# Patient Record
Sex: FEMALE | Race: WHITE | Employment: UNEMPLOYED | URBAN - METROPOLITAN AREA
[De-identification: names, ages, dates, MRNs, and addresses within clinical notes are randomized per-mention and may not be internally consistent; named-entity substitution may affect disease eponyms.]

---
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
## 2022-01-24 ENCOUNTER — CLINICAL SUPPORT (OUTPATIENT)
Dept: FAMILY MEDICINE CLINIC | Age: 22
End: 2022-01-24
Payer: COMMERCIAL

## 2022-01-24 VITALS — TEMPERATURE: 98.1 F

## 2022-01-24 DIAGNOSIS — Z11.1 ENCOUNTER FOR PPD TEST: Primary | ICD-10-CM

## 2022-01-24 PROCEDURE — 86580 TB INTRADERMAL TEST: CPT | Performed by: FAMILY MEDICINE

## 2022-01-24 NOTE — PROGRESS NOTES
Chief Complaint   Patient presents with    PPD Reading     skin test       No Known Allergies     Visit Vitals  Temp 98.1 °F (36.7 °C) (Temporal)        Test done in right forearm  0.1ml   ndc [id number]  [id number]  EXP 6/2/23

## 2022-01-26 ENCOUNTER — CLINICAL SUPPORT (OUTPATIENT)
Dept: FAMILY MEDICINE CLINIC | Age: 22
End: 2022-01-26

## 2022-01-26 DIAGNOSIS — Z11.1 ENCOUNTER FOR PPD TEST: Primary | ICD-10-CM

## 2022-01-26 LAB
MM INDURATION POC: 0 MM (ref 0–5)
PPD POC: NEGATIVE NEGATIVE

## 2022-01-28 LAB — SARS-COV-2, NAA: DETECTED

## 2022-03-18 PROBLEM — N91.4 SECONDARY OLIGOMENORRHEA: Status: ACTIVE | Noted: 2017-12-05

## 2022-03-18 PROBLEM — N91.1 SECONDARY AMENORRHEA: Status: ACTIVE | Noted: 2017-04-19

## 2022-03-18 PROBLEM — Z01.01 FAILED VISION SCREEN: Status: ACTIVE | Noted: 2017-04-19

## 2022-03-19 PROBLEM — K21.9 GASTRIC REFLUX: Status: ACTIVE | Noted: 2019-07-19

## 2022-03-20 PROBLEM — B80 PINWORM DISEASE: Status: ACTIVE | Noted: 2019-07-19

## 2022-08-29 ENCOUNTER — OFFICE VISIT (OUTPATIENT)
Dept: ORTHOPEDIC SURGERY | Age: 22
End: 2022-08-29
Payer: COMMERCIAL

## 2022-08-29 VITALS — WEIGHT: 135 LBS | HEIGHT: 65 IN | BODY MASS INDEX: 22.49 KG/M2

## 2022-08-29 DIAGNOSIS — M25.561 RIGHT KNEE PAIN, UNSPECIFIED CHRONICITY: Primary | ICD-10-CM

## 2022-08-29 PROCEDURE — 99214 OFFICE O/P EST MOD 30 MIN: CPT | Performed by: ORTHOPAEDIC SURGERY

## 2022-08-29 NOTE — PROGRESS NOTES
Marylou Rivera (: 2000) is a 23 y.o. female patient, here for evaluation of the following chief complaint(s):  Knee Pain (Follow up on Right knee pain  for 1 year. Prior MRI done. TX exercises)       ASSESSMENT/PLAN:  Below is the assessment and plan developed based on review of pertinent history, physical exam, labs, studies, and medications. Anterior knee pain did not respond to physical therapy I do not know how hard she tried she had an MRI back in 2021 it was remarkable for some degenerative changes of the posterior horn of the medial meniscus and an elevated TT-TG distance we had a lengthy discussion about a Lisa osteotomy and a knee scope I like her to try physical therapy again I do not think she gave her best she is college student at [employer] working to set her up with PT and regroup in February or March if she continues to be miserable then I think a scope lateral release Lisa are in order we could do this in May give her the summer to recover 40+ minutes face-to-face time      1. Right knee pain, unspecified chronicity  -     XR KNEE RT MIN 4 V; Future      No follow-ups on file. SUBJECTIVE/OBJECTIVE:  Marylou Rivera (: 2000) is a 23 y.o. female who presents today for the following:  Chief Complaint   Patient presents with    Knee Pain     Follow up on Right knee pain  for 1 year. Prior MRI done.  TX exercises       Persistent right knee pain we have tried physical therapy home exercise program points the patella on the undersurface of the knee as a site of discomfort has not responded to conservative measures    IMAGING:  Reviewed her MRI AP lateral sunrise tunnel view of the right knee no fracture no loose body no OCD lesion growth plates are closed she is skeletally mature    No Known Allergies    Current Outpatient Medications   Medication Sig    ibuprofen (MOTRIN) 600 mg tablet Take 1 Tablet by mouth every six (6) hours as needed for Pain.    metFORMIN ER (GLUCOPHAGE XR) 750 mg tablet 2 tabs at dinner  Indications: polycystic ovarian syndrome, a disease with cysts in the ovaries    tretinoin (RETIN-A) 0.025 % topical cream     clindamycin-benzoyl-emol cmb94 6.8-1 % cpcg 1 Applicator by Apply Externally route daily. ferrous sulfate 325 mg (65 mg iron) tablet Take 1 Tab by mouth Daily (before breakfast). (Patient not taking: Reported on 8/29/2022)    ergocalciferol (ERGOCALCIFEROL) 1,250 mcg (50,000 unit) capsule Take 1 Capsule by mouth every seven (7) days. (Patient not taking: Reported on 8/29/2022)    cholecalciferol (VITAMIN D3) (2,000 UNITS /50 MCG) cap capsule Take 2,000 Units by mouth daily. (Patient not taking: No sig reported)    fluticasone propionate (FLONASE) 50 mcg/actuation nasal spray USE ONE SPRAY IN EACH NOSTRIL EVERY DAY (Patient not taking: No sig reported)     No current facility-administered medications for this visit. Past Medical History:   Diagnosis Date    Allergic rhinitis 12/21/2011    Freckled skin 6/24/2014    PCOS (polycystic ovarian syndrome)         History reviewed. No pertinent surgical history. Family History   Problem Relation Age of Onset    Cancer Maternal Grandmother         Social History     Tobacco Use    Smoking status: Never    Smokeless tobacco: Never   Substance Use Topics    Alcohol use: No        Review of Systems     No flowsheet data found. Vitals:  Ht 5' 5\" (1.651 m)   Wt 135 lb (61.2 kg)   BMI 22.47 kg/m²    Body mass index is 22.47 kg/m². Physical Exam    Pleasant young lady well-groomed anterior knee pain with a J sign tight lateral retinaculum positive grind positive apprehension right hip is full painless range of motion the patient ambulates with a nonantalgic gait. There is negative Trendelenburg gait. There is no tenderness to palpation in the groin, greater trochanter, sciatic notch or sacroiliac joints. There are no masses, redness or ecchymoses.   There is no crepitus to range of motion. No pain with flexion and internal rotation. There is no instability present in the hip. There is no snapping noted. There is grade 5/5 muscle strength. Light touch is intact. Deep tendon reflexes are +2.  +2 pulses at the posterior tib. dorsal pedis. There are no café au lait spots or fibromyalgia. No lymphadenopathy present. No limb length discrepancy. Right knee has full extension stable to varus and valgus stress negative Lachman negative drawer no obvious effusion skin looks good      An electronic signature was used to authenticate this note.   -- Carlos Branch MD

## 2024-12-18 ENCOUNTER — OFFICE VISIT (OUTPATIENT)
Age: 24
End: 2024-12-18
Payer: COMMERCIAL

## 2024-12-18 VITALS
HEART RATE: 70 BPM | TEMPERATURE: 97.9 F | RESPIRATION RATE: 16 BRPM | OXYGEN SATURATION: 100 % | HEIGHT: 65 IN | WEIGHT: 143 LBS | BODY MASS INDEX: 23.82 KG/M2 | SYSTOLIC BLOOD PRESSURE: 113 MMHG | DIASTOLIC BLOOD PRESSURE: 74 MMHG

## 2024-12-18 DIAGNOSIS — N91.4 SECONDARY OLIGOMENORRHEA: ICD-10-CM

## 2024-12-18 DIAGNOSIS — L65.9 NONSCARRING HAIR LOSS: ICD-10-CM

## 2024-12-18 DIAGNOSIS — E28.2 PCOS (POLYCYSTIC OVARIAN SYNDROME): ICD-10-CM

## 2024-12-18 DIAGNOSIS — L98.9 SKIN LESION: ICD-10-CM

## 2024-12-18 DIAGNOSIS — Z00.00 WELL ADULT EXAM: Primary | ICD-10-CM

## 2024-12-18 DIAGNOSIS — Z23 ENCOUNTER FOR IMMUNIZATION: ICD-10-CM

## 2024-12-18 DIAGNOSIS — I95.1 ORTHOSTATIC HYPOTENSION: ICD-10-CM

## 2024-12-18 DIAGNOSIS — Z11.1 SCREENING FOR TUBERCULOSIS: ICD-10-CM

## 2024-12-18 DIAGNOSIS — Z76.89 ENCOUNTER TO ESTABLISH CARE: ICD-10-CM

## 2024-12-18 PROBLEM — Z01.01 FAILED VISION SCREEN: Status: RESOLVED | Noted: 2017-04-19 | Resolved: 2024-12-18

## 2024-12-18 PROBLEM — B80 PINWORM DISEASE: Status: RESOLVED | Noted: 2019-07-19 | Resolved: 2024-12-18

## 2024-12-18 LAB
ANION GAP SERPL CALC-SCNC: 3 MMOL/L (ref 2–12)
BASOPHILS # BLD: 0.1 K/UL (ref 0–0.1)
BASOPHILS NFR BLD: 1 % (ref 0–1)
BUN SERPL-MCNC: 14 MG/DL (ref 6–20)
BUN/CREAT SERPL: 22 (ref 12–20)
CALCIUM SERPL-MCNC: 10.3 MG/DL (ref 8.5–10.1)
CHLORIDE SERPL-SCNC: 105 MMOL/L (ref 97–108)
CO2 SERPL-SCNC: 27 MMOL/L (ref 21–32)
CREAT SERPL-MCNC: 0.63 MG/DL (ref 0.55–1.02)
DIFFERENTIAL METHOD BLD: ABNORMAL
EOSINOPHIL # BLD: 0.2 K/UL (ref 0–0.4)
EOSINOPHIL NFR BLD: 3 % (ref 0–7)
ERYTHROCYTE [DISTWIDTH] IN BLOOD BY AUTOMATED COUNT: 14.4 % (ref 11.5–14.5)
GLUCOSE SERPL-MCNC: 80 MG/DL (ref 65–100)
HCT VFR BLD AUTO: 37.7 % (ref 35–47)
HGB BLD-MCNC: 11.8 G/DL (ref 11.5–16)
IMM GRANULOCYTES # BLD AUTO: 0 K/UL (ref 0–0.04)
IMM GRANULOCYTES NFR BLD AUTO: 0 % (ref 0–0.5)
IRON SATN MFR SERPL: 19 % (ref 20–50)
IRON SERPL-MCNC: 78 UG/DL (ref 35–150)
LYMPHOCYTES # BLD: 2.9 K/UL (ref 0.8–3.5)
LYMPHOCYTES NFR BLD: 55 % (ref 12–49)
MCH RBC QN AUTO: 23.8 PG (ref 26–34)
MCHC RBC AUTO-ENTMCNC: 31.3 G/DL (ref 30–36.5)
MCV RBC AUTO: 76 FL (ref 80–99)
MONOCYTES # BLD: 0.5 K/UL (ref 0–1)
MONOCYTES NFR BLD: 9 % (ref 5–13)
NEUTS SEG # BLD: 1.7 K/UL (ref 1.8–8)
NEUTS SEG NFR BLD: 32 % (ref 32–75)
NRBC # BLD: 0 K/UL (ref 0–0.01)
NRBC BLD-RTO: 0 PER 100 WBC
PLATELET # BLD AUTO: 364 K/UL (ref 150–400)
PMV BLD AUTO: 10.9 FL (ref 8.9–12.9)
POTASSIUM SERPL-SCNC: 4.3 MMOL/L (ref 3.5–5.1)
RBC # BLD AUTO: 4.96 M/UL (ref 3.8–5.2)
SODIUM SERPL-SCNC: 135 MMOL/L (ref 136–145)
TIBC SERPL-MCNC: 403 UG/DL (ref 250–450)
WBC # BLD AUTO: 5.3 K/UL (ref 3.6–11)

## 2024-12-18 PROCEDURE — 99204 OFFICE O/P NEW MOD 45 MIN: CPT | Performed by: STUDENT IN AN ORGANIZED HEALTH CARE EDUCATION/TRAINING PROGRAM

## 2024-12-18 PROCEDURE — 99385 PREV VISIT NEW AGE 18-39: CPT | Performed by: STUDENT IN AN ORGANIZED HEALTH CARE EDUCATION/TRAINING PROGRAM

## 2024-12-18 PROCEDURE — 90715 TDAP VACCINE 7 YRS/> IM: CPT | Performed by: STUDENT IN AN ORGANIZED HEALTH CARE EDUCATION/TRAINING PROGRAM

## 2024-12-18 RX ORDER — METFORMIN HYDROCHLORIDE 750 MG/1
750 TABLET, EXTENDED RELEASE ORAL
Qty: 90 TABLET | Refills: 0 | Status: SHIPPED | OUTPATIENT
Start: 2024-12-18

## 2024-12-18 SDOH — ECONOMIC STABILITY: FOOD INSECURITY: WITHIN THE PAST 12 MONTHS, YOU WORRIED THAT YOUR FOOD WOULD RUN OUT BEFORE YOU GOT MONEY TO BUY MORE.: NEVER TRUE

## 2024-12-18 SDOH — ECONOMIC STABILITY: FOOD INSECURITY: WITHIN THE PAST 12 MONTHS, THE FOOD YOU BOUGHT JUST DIDN'T LAST AND YOU DIDN'T HAVE MONEY TO GET MORE.: NEVER TRUE

## 2024-12-18 SDOH — ECONOMIC STABILITY: INCOME INSECURITY: HOW HARD IS IT FOR YOU TO PAY FOR THE VERY BASICS LIKE FOOD, HOUSING, MEDICAL CARE, AND HEATING?: NOT HARD AT ALL

## 2024-12-18 ASSESSMENT — ENCOUNTER SYMPTOMS
SHORTNESS OF BREATH: 0
COUGH: 0

## 2024-12-18 ASSESSMENT — PATIENT HEALTH QUESTIONNAIRE - PHQ9
SUM OF ALL RESPONSES TO PHQ QUESTIONS 1-9: 0
2. FEELING DOWN, DEPRESSED OR HOPELESS: NOT AT ALL
SUM OF ALL RESPONSES TO PHQ QUESTIONS 1-9: 0
SUM OF ALL RESPONSES TO PHQ9 QUESTIONS 1 & 2: 0
1. LITTLE INTEREST OR PLEASURE IN DOING THINGS: NOT AT ALL

## 2024-12-18 NOTE — PROGRESS NOTES
After obtaining consent, and per orders of Dr. Valentin, injection of TDAP given in Left deltoid by Olivia Crawford LPN. Patient instructed to remain in clinic for 20 minutes afterwards, and to report any adverse reaction to me immediately.RM:    Chief Complaint   Patient presents with    Establish Care     Pt would like to establish care       Fasting No    Vitals:    12/18/24 1058   BP: 113/74   Site: Left Upper Arm   Position: Sitting   Cuff Size: Large Adult   Pulse: 70   Resp: 16   Temp: 97.9 °F (36.6 °C)   TempSrc: Oral   SpO2: 100%   Weight: 64.9 kg (143 lb)   Height: 1.651 m (5' 5\")             No data to display                \"Have you been to the ER, urgent care clinic since your last visit?  Hospitalized since your last visit?\"    NO    “Have you seen or consulted any other health care providers outside of [facility] since your last visit?”    NO     “Have you had a pap smear?”    NO    No cervical cancer screening on file             Click Here for Release of Records Request   AVS  education, follow up, and recommendations provided and addressed with patient.  services used to advise patient no  
history and medications.  I have reviewed pertinent labs results and other data. I have discussed the diagnosis with the patient and the intended plan as seen in the above orders. The patient has received an after-visit summary and questions were answered concerning future plans. I have discussed medication side effects and warnings with the patient as well.    Oscar Valentin MD  12/18/24

## 2024-12-20 LAB — TSH SERPL DL<=0.05 MIU/L-ACNC: 1.8 UIU/ML (ref 0.45–4.5)

## 2024-12-26 LAB
M TB IFN-G BLD-IMP: NEGATIVE
M TB IFN-G CD4+ T-CELLS BLD-ACNC: 0.3 IU/ML
M TBIFN-G CD4+ CD8+T-CELLS BLD-ACNC: 0.28 IU/ML
QUANTIFERON CRITERIA: NORMAL
QUANTIFERON MITOGEN VALUE: >10 IU/ML
QUANTIFERON NIL VALUE: 0.58 IU/ML

## 2025-06-19 ENCOUNTER — OFFICE VISIT (OUTPATIENT)
Age: 25
End: 2025-06-19
Payer: COMMERCIAL

## 2025-06-19 VITALS
BODY MASS INDEX: 23.79 KG/M2 | TEMPERATURE: 97.9 F | SYSTOLIC BLOOD PRESSURE: 107 MMHG | HEIGHT: 65 IN | DIASTOLIC BLOOD PRESSURE: 71 MMHG | WEIGHT: 142.8 LBS | RESPIRATION RATE: 16 BRPM | OXYGEN SATURATION: 98 % | HEART RATE: 86 BPM

## 2025-06-19 DIAGNOSIS — B36.0 TINEA VERSICOLOR: Primary | ICD-10-CM

## 2025-06-19 DIAGNOSIS — Z11.1 SCREENING FOR TUBERCULOSIS: ICD-10-CM

## 2025-06-19 DIAGNOSIS — R53.83 FATIGUE, UNSPECIFIED TYPE: ICD-10-CM

## 2025-06-19 DIAGNOSIS — R21 RASH: ICD-10-CM

## 2025-06-19 PROCEDURE — 99214 OFFICE O/P EST MOD 30 MIN: CPT | Performed by: STUDENT IN AN ORGANIZED HEALTH CARE EDUCATION/TRAINING PROGRAM

## 2025-06-19 RX ORDER — KETOCONAZOLE 20 MG/ML
SHAMPOO, SUSPENSION TOPICAL
Qty: 120 ML | Refills: 2 | Status: SHIPPED | OUTPATIENT
Start: 2025-06-19

## 2025-06-19 SDOH — ECONOMIC STABILITY: FOOD INSECURITY: WITHIN THE PAST 12 MONTHS, YOU WORRIED THAT YOUR FOOD WOULD RUN OUT BEFORE YOU GOT MONEY TO BUY MORE.: NEVER TRUE

## 2025-06-19 SDOH — ECONOMIC STABILITY: FOOD INSECURITY: WITHIN THE PAST 12 MONTHS, THE FOOD YOU BOUGHT JUST DIDN'T LAST AND YOU DIDN'T HAVE MONEY TO GET MORE.: NEVER TRUE

## 2025-06-19 ASSESSMENT — ENCOUNTER SYMPTOMS
SHORTNESS OF BREATH: 0
COUGH: 0

## 2025-06-19 ASSESSMENT — PATIENT HEALTH QUESTIONNAIRE - PHQ9
1. LITTLE INTEREST OR PLEASURE IN DOING THINGS: NOT AT ALL
SUM OF ALL RESPONSES TO PHQ QUESTIONS 1-9: 0
2. FEELING DOWN, DEPRESSED OR HOPELESS: NOT AT ALL

## 2025-06-19 NOTE — PROGRESS NOTES
RM:7    Chief Complaint   Patient presents with    Other     Pt is here to follow up for skin lesions on back, and chest        Fasting No    Vitals:    06/19/25 1605   BP: 107/71   BP Site: Left Upper Arm   Patient Position: Sitting   BP Cuff Size: Medium Adult   Pulse: 86   Resp: 16   Temp: 97.9 °F (36.6 °C)   TempSrc: Oral   SpO2: 98%   Weight: 64.8 kg (142 lb 12.8 oz)   Height: 1.651 m (5' 5\")             No data to display                \"Have you been to the ER, urgent care clinic since your last visit?  Hospitalized since your last visit?\"    NO    “Have you seen or consulted any other health care providers outside of [facility] since your last visit?”    NO     “Have you had a pap smear?”    NO    No cervical cancer screening on file             Click Here for Release of Records Request   AVS  education, follow up, and recommendations provided and addressed with patient.  services used to advise patient no

## 2025-06-19 NOTE — PROGRESS NOTES
[facility]    Mackenzie Low (:  2000) is a 25 y.o. female, established patient, here for evaluation of the following chief complaint(s):  Other (Pt is here to follow up for skin lesions on back, and chest )      Assessment & Plan   Assessment & Plan  Tinea versicolor.  Likely cause of skin lesions. Increased size of lesions, new patches on back. Chronic. Not at goal.  - Ketoconazole 2% shampoo, twice a week for 2-4 weeks. Further evaluation if no improvement after 4 weeks.  - Dermatology consultation on 2025.    Fatigue  Chronic, not at goal. Family hx of autoimmune conditions. Given fatigue, rash, and polyarthritis, reasonable to check for autoimmune conditions.  - Check LESLIE, CRP, ESR  - Check Vitamin D    Finger swelling  Symptoms: finger swelling and redness, especially in pinky and ring fingers of right hand, exacerbated by cold. Does not become white or purple as in Raynaud's.   - Monitoring hand inflammation; further evaluation if symptoms worsen.    Health Maintenance.  - Ordered QuantiFERON-TB Gold test for TB screening for [employer]    Follow-up: Follow-up physical exam scheduled for 2025.    1. Tinea versicolor  -     ketoconazole (NIZORAL) 2 % shampoo; Apply 5 to 10 mL to rash on torso, lather, leave on 3 to 5 minutes, and rinse; apply twice weekly for 2 to 4 weeks., Disp-120 mL, R-2, Normal  2. Screening for tuberculosis  -     Quantiferon, Incubated; Future  3. Fatigue, unspecified type  -     Vitamin D 25 Hydroxy; Future  -     Sedimentation Rate; Future  -     LESLIE by IFA w/Reflex; Future  -     C-Reactive Protein; Future  4. Rash  -     Sedimentation Rate; Future  -     LESLIE by IFA w/Reflex; Future  -     C-Reactive Protein; Future      No follow-ups on file.           The patient (or guardian, if applicable) and other individuals in attendance with the patient were advised that Artificial Intelligence will be utilized during this visit to record

## 2025-06-25 ENCOUNTER — RESULTS FOLLOW-UP (OUTPATIENT)
Age: 25
End: 2025-06-25

## 2025-07-18 ENCOUNTER — PATIENT MESSAGE (OUTPATIENT)
Age: 25
End: 2025-07-18

## 2025-07-18 DIAGNOSIS — R21 RASH: ICD-10-CM

## 2025-07-18 DIAGNOSIS — R76.8 ANA POSITIVE: ICD-10-CM

## 2025-07-18 DIAGNOSIS — R53.83 FATIGUE, UNSPECIFIED TYPE: Primary | ICD-10-CM
